# Patient Record
Sex: MALE | Race: ASIAN | NOT HISPANIC OR LATINO | URBAN - METROPOLITAN AREA
[De-identification: names, ages, dates, MRNs, and addresses within clinical notes are randomized per-mention and may not be internally consistent; named-entity substitution may affect disease eponyms.]

---

## 2023-09-26 ENCOUNTER — EMERGENCY (EMERGENCY)
Facility: HOSPITAL | Age: 23
LOS: 1 days | Discharge: HOME | End: 2023-09-26
Attending: STUDENT IN AN ORGANIZED HEALTH CARE EDUCATION/TRAINING PROGRAM | Admitting: STUDENT IN AN ORGANIZED HEALTH CARE EDUCATION/TRAINING PROGRAM
Payer: COMMERCIAL

## 2023-09-26 VITALS
OXYGEN SATURATION: 100 % | SYSTOLIC BLOOD PRESSURE: 128 MMHG | DIASTOLIC BLOOD PRESSURE: 89 MMHG | TEMPERATURE: 98 F | RESPIRATION RATE: 18 BRPM | HEART RATE: 75 BPM

## 2023-09-26 PROCEDURE — 99284 EMERGENCY DEPT VISIT MOD MDM: CPT

## 2023-09-26 PROCEDURE — 70450 CT HEAD/BRAIN W/O DYE: CPT | Mod: 26,MA

## 2023-09-26 PROCEDURE — 70486 CT MAXILLOFACIAL W/O DYE: CPT | Mod: 26,MA

## 2023-09-26 RX ORDER — ACETAMINOPHEN 500 MG
975 TABLET ORAL ONCE
Refills: 0 | Status: COMPLETED | OUTPATIENT
Start: 2023-09-26 | End: 2023-09-26

## 2023-09-26 RX ADMIN — Medication 1 TABLET(S): at 20:01

## 2023-09-26 RX ADMIN — Medication 975 MILLIGRAM(S): at 13:46

## 2023-09-26 NOTE — ED PROVIDER NOTE - PATIENT PORTAL LINK FT
You can access the FollowMyHealth Patient Portal offered by [facility] by registering at the following website: [email or web address]. By joining [employer]’s FollowMyHealth portal, you will also be able to view your health information using other applications (apps) compatible with our system.

## 2023-09-26 NOTE — ED PROVIDER NOTE - OBJECTIVE STATEMENT
22-year-old male no past medical history presents to ED status post head trauma 5 days ago.  Patient states he was helping his family member clean the house when the floor was wet and he slipped forward hitting his face and losing consciousness.  Patient complaining of left periorbital swelling and bruising that has not resolved and left eye redness.  Patient denies headache dizziness, amnesia, nausea vomiting, chest pain, back pain, neck pain weakness numbness or tingling facial droop or slurred speech. No vision changes. No blood thinners.

## 2023-09-26 NOTE — ED ADULT NURSE NOTE - NSFALLUNIVINTERV_ED_ALL_ED
Bed/Stretcher in lowest position, wheels locked, appropriate side rails in place/Call bell, personal items and telephone in reach/Instruct patient to call for assistance before getting out of bed/chair/stretcher/Non-slip footwear applied when patient is off stretcher/[place] to call system/Physically safe environment - no spills, clutter or unnecessary equipment/Purposeful proactive rounding/Room/bathroom lighting operational, light cord in reach

## 2023-09-26 NOTE — ED PROVIDER NOTE - ENMT, MLM
Airway patent, Nasal mucosa clear. Mouth with normal mucosa. Throat has no vesicles, no oropharyngeal exudates and uvula is midline. No septal hematoma. Tms grey b/l no hemotympanum. Left raccoon eye and subconjunctival hemorrhage. No facial bony ttp.

## 2023-09-26 NOTE — ED ADULT NURSE NOTE - NSFALLRISK_ED_ALL_ED
Patient is requesting all [facility] be sent [facility]abella Spotted [facility] due to loss insurance No

## 2023-09-26 NOTE — CONSULT NOTE ADULT - ASSESSMENT
Assessment:  22M w/ non-contributory hx presenting to ED, with Left ZMC with non displaced ruben-Lefort 1 s/p mechanical fall with LOC 5 days ago. Reports no changes in vision/diplopia, denies headache/nausea.     Plan:  - Given 5 day injury with remaining swelling/edema and stable clinical exam, will recommend F/U on Friday in the clinic, plan for possible ORIF of L ZMC  - Soft non chew diet  - Sinus precautions  - Augmentin 875-125mg BIDx7 days  - Pain medication.    Outpatient clinic phone: [phone]    Oral and Maxillofacial Surgery  #[id number]

## 2023-09-26 NOTE — ED PROVIDER NOTE - NS ED ATTENDING STATEMENT MOD
This was a shared visit with the GIDEON. I reviewed and verified the documentation and independently performed the documented:

## 2023-09-26 NOTE — ED PROVIDER NOTE - MUSCULOSKELETAL, MLM
Spine appears normal, range of motion is not limited, no muscle or joint tenderness. No midline spinal ttp.

## 2023-09-26 NOTE — ED PROVIDER NOTE - NSFOLLOWUPINSTRUCTIONS_ED_ALL_ED_FT
Follow up with the [facility] Dental clinic this Friday for your facial fractures. Call tomorrow for an appointment. the phone number is , the address is [facility] [place] [address]. Take the antibiotic (Augmentin) two times per day for 7 days, use artifical tears in your left eye 4 times per day, take the Polytrim (antibiotic) drops 4 times per day and use the erythromycin you were given before bedtime. Do not drink through straws and do not blow your nose. Follow up with the [facility] Ophthalmology clinic for your corneal abrasion at [address] [facility] [address] [facility] [address] [facility] [address] . Take the antibiotic pills (Augmentin) as prescribed and take Naproxen as needed for pain. Advance activity as tolerated.  Continue all previously prescribed medications as directed.  Follow up with your primary care physician in 48-72 hours- bring copies of your results.  Return to the ER for worsening or persistent symptoms, and/or ANY NEW OR CONCERNING SYMPTOMS. THIS INCLUDES BUT IS NOT LIMITED TO FEVER, CHILLS, NIGHTSWEATS, SEVERE PAIN OR FOR ANY OTHER SYMPTOMS THAT CONCERN YOU. If you have issues obtaining follow up, please call: [phone] to obtain a doctor or specialist who takes your insurance in your area.  You may call [phone] to make an appointment with the internal medicine clinic.

## 2023-09-26 NOTE — ED PROVIDER NOTE - RESPIRATORY, MLM
Breath sounds clear and equal bilaterally. Slit Excision Additional Text (Leave Blank If You Do Not Want): A linear line was drawn on the skin overlying the lesion. An incision was made slowly until the lesion was visualized.  Once visualized, the lesion was removed with blunt dissection.

## 2023-09-26 NOTE — CONSULT NOTE ADULT - SUBJECTIVE AND OBJECTIVE BOX
Subjective:  22M w/ non-contributory hx presenting to ED s/p 5 day hx of L C fx s/p slipping on the floor indoor, fall on the face, LOC. Pt presented to ED today after exacerbation of the Left periorbital edema, ecchymosis, eye redness. Pt reports occlusion feels grossly normal but has mild change in the posterior left. Pt denies change/loss of vision, diplopia, blurry vision, headache, dizziness, nausea, otorrhea, tinnitus, SOB.     ICU Vital Signs Last 24 Hrs  T(C): 36.9 (26 Sep 2023 12:22), Max: 36.9 (26 Sep 2023 12:22)  T(F): 98.5 (26 Sep 2023 12:22), Max: 98.5 (26 Sep 2023 12:22)  HR: 66 (26 Sep 2023 18:38) (66 - 75)  BP: 116/77 (26 Sep 2023 18:38) (116/77 - 128/89)  BP(mean): --  ABP: --  ABP(mean): --  RR: 16 (26 Sep 2023 18:38) (16 - 18)  SpO2: 100% (26 Sep 2023 18:38) (100% - 100%)    O2 Parameters below as of 26 Sep 2023 18:38  Patient On (Oxygen Delivery Method): room air    Physical Exam  H: L midface edema and ecchymosis without gross esthetic deformity, L arch without gross palpable step or deformity. L periorbital ecchymosis, edema  E: L subconjunctival hemorrhage, PERRL, EOMI b/l  E: (-) otorrhea, hearing at baseline  N: (-) rhinorrhea, no septal hematoma, nares patent b/l  Maxillofacial: Baseline mild L anterior open bite. New mild posterior open bit. Wears partial denture. PAUL ~30mm, minimal L posterior maxillary step defect, no segmental mobility. Stable occlusion b/l.    PAST MEDICAL & SURGICAL HISTORY:  No pertinent past medical history  No significant past surgical history      Home Medications:  N/A    Allergies  No Known Allergies    CT Maxillofacial (9/26/23)  FACIAL BONES: Acute comminuted mildly displaced fracture of the anterior, medial and posterolateral walls of the the left maxillary sinus with associated blunting of the premaxillary fat pad and hemorrhage in the left maxillary sinus .Additionally there are acute mildly displaced comminuted fractures of the inferior and lateral orbital wall and left lamina papyracea. Fracture appears to involve the left infraorbital foramen. There is an acute mildly displaced fracture of the lateral process of the left zygoma. The pterygoid plate is within normal limits.  MANDIBLE: No acute fractures.  REMAINING VISUALIZED BONES: Cervical spine is intact.  SINONASAL CAVITIES: High-density products likely hemorrhage opacifying the left maxillary sinus. Mild mucosal thickening of the sphenoid and frontal sinuses. Nasal septal comminuted fracture, image 2-194.  ORBITAL CONTENTS: Soft tissue swelling overlying the left orbit. No retrobulbar hemorrhage. Intraorbital muscles are intact.    IMPRESSION:  Acute left-sided zygomaticomaxillary complex fracture with fractures of the orbit and nasal septum as described in body the report. No associated intraorbital complications.

## 2023-09-26 NOTE — ED ADULT NURSE NOTE - OBJECTIVE STATEMENT
Patient received in wellness, exam room 1. Patient A&Ox3 and ambulatory at baseline. Patient presents to the ED s/p mechanical fall approximately 5 days ago. Patient denies significant phx. Patient states approximately 5 days ago in [facility] he slipped and fell. Patient states he hit his face; patient endorses hitting head and losing consciousness. Patient denies anticoagulant use. Ecchymosis noted to periorbital area, left side of nose and upper lip area; blood/redness noted to left sclera. Patient denies headache, dizziness, lightheadedness, photophobia, vision changes, nausea/vomiting, fever/chills. Patient offers no complaints at this time. Respirations even and unlabored, no signs/symptoms of acute distress; patient denies dyspnea, shortness of breath, and chest pain. Patient is stable at this time. Steady gait observed.

## 2023-09-26 NOTE — ED PROVIDER NOTE - CLINICAL SUMMARY MEDICAL DECISION MAKING FREE TEXT BOX
22-year-old male no past medical history presents to ED status post head trauma 5 days ago.  Patient states he was helping his family member clean the house when the floor was wet and he slipped forward hitting his face and losing consciousness.  Patient complaining of left periorbital swelling and bruising that has not resolved and left eye redness. on exam pt with L raccoon eye and subconjunctival hemorrhage. plan to scan CT head, Ct max face r/o fracture pt with concussion

## 2023-09-26 NOTE — ED PROVIDER NOTE - PROGRESS NOTE DETAILS
PRABHAKAR Wayne- omfs evaluated pt. recommending optho consult. awaiting omfs resident to speak to attending for plan. PRABHAKAR Wayne- spoke with optho will see pt in ED. per omfs, suggesting dc home after optho eval and can have outpatient surgery follow up this Friday. PRABHAKAR Montaño: Received signout on the pt, pt is stable, will D/C as per OMFS and Ophtho recommendations.

## 2023-09-26 NOTE — ED PROVIDER NOTE - ATTENDING APP SHARED VISIT CONTRIBUTION OF CARE
I have personally performed a face to face medical and diagnostic evaluation of the patient. I have discussed with and reviewed the ACP's note and agree with the History, ROS, Physical Exam and MDM unless otherwise indicated. A brief summary of my personal evaluation and impression can be found below.     22-year-old male no past medical history presents to ED status post head trauma 5 days ago.  Patient states he was helping his family member clean the house when the floor was wet and he slipped forward hitting his face and losing consciousness.  Patient complaining of left periorbital swelling and bruising that has not resolved and left eye redness. On exam, vital signs stable, patient neuro intact, but with tenderness to the superior outer portion of the left orbit.  Subconjunctival hemorrhage covering the exterior eye as well.  Visual acuity intact.  No pain in extraocular movements.  Hematoma to the lower portion of the orbit.  Given extent of symptoms, plan for CT head, Ct max face  to assess for any intracranial injury or facial fractures.  Plan for pain control.  Patient likely has a concussion.  Will reassess to dispo. Wanda Mendoza, ED Attending

## 2023-09-26 NOTE — ED ADULT TRIAGE NOTE - CHIEF COMPLAINT QUOTE
Pt states he had a fall Thursday while intoxicated and noted to have L sided facial swelling and bruising. Pt denies LOC.

## 2023-09-27 VITALS
RESPIRATION RATE: 18 BRPM | OXYGEN SATURATION: 100 % | HEART RATE: 70 BPM | DIASTOLIC BLOOD PRESSURE: 83 MMHG | TEMPERATURE: 98 F | SYSTOLIC BLOOD PRESSURE: 114 MMHG

## 2023-09-27 RX ORDER — POLYMYXIN B SULF/TRIMETHOPRIM 10000-1/ML
1 DROPS OPHTHALMIC (EYE) ONCE
Refills: 0 | Status: COMPLETED | OUTPATIENT
Start: 2023-09-27 | End: 2023-09-27

## 2023-09-27 RX ORDER — POLYMYXIN B SULF/TRIMETHOPRIM 10000-1/ML
1 DROPS OPHTHALMIC (EYE) ONCE
Refills: 0 | Status: DISCONTINUED | OUTPATIENT
Start: 2023-09-27 | End: 2023-09-27

## 2023-09-27 RX ORDER — POLYMYXIN B SULF/TRIMETHOPRIM 10000-1/ML
1 DROPS OPHTHALMIC (EYE)
Qty: 1 | Refills: 0
Start: 2023-09-27 | End: 2023-10-01

## 2023-09-27 RX ADMIN — Medication 1 DROP(S): at 01:30

## 2023-09-27 NOTE — CONSULT NOTE ADULT - SUBJECTIVE AND OBJECTIVE BOX
[facility] DEPARTMENT OF OPHTHALMOLOGY - INITIAL ADULT CONSULT  -----------------------------------------------------------------------------------------------------------------  Raad Alfaro MD, PGY3  Available on [employer] Teams  -----------------------------------------------------------------------------------------------------------------    HPI:    22-year-old male no past medical history presents to ED status post head trauma 5 days ago.  Patient states he was helping his family member clean the house when the floor was wet and he slipped forward hitting his face and losing consciousness.  Patient complaining of left periorbital swelling and bruising that has not resolved and left eye redness.  Patient denies headache dizziness, amnesia, nausea vomiting, chest pain, back pain, neck pain weakness numbness or tingling facial droop or slurred speech. No vision changes. No blood thinners.    Interval History: patient denies any vision changes, no diplopia, no flashes, no floaters, no erythema. Has had subconj heme but no irritation, pain, tearing, discharge.    Past Medical History: none  Past Ocular History: none  Drops: none  Medications: none  Allergies: NKDA  Family History: denies  Surgical History: denies  Outpatient Ophthalmologist: none      Review of Systems:  Constitutional: No fever, chills  Eyes: No blurry vision, flashes, floaters, FBS, erythema, discharge, double vision, OU  Neuro: No tremors  Cardiovascular: No chest pain, palpitations  Respiratory: No SOB, no cough  GI: No nausea, vomiting, abdominal pain    Vital Signs: T(C): 36.9 (09-26-23 @ 12:22)  T(F): 98.5 (09-26-23 @ 12:22), Max: 98.5 (09-26-23 @ 12:22)  HR: 66 (09-26-23 @ 18:38) (66 - 75)  BP: 116/77 (09-26-23 @ 18:38) (116/77 - 128/89)  RR:  (16 - 18)  SpO2:  (100% - 100%)  Wt(kg): --  AAOx3    Ophthalmology Exam:  Visual acuity (cc): 20/20 OD. 20/20 OS  Pupils: PERRL OU, no APD  Intraocular Pressure:  Ttono 17 OD, 14 OS  Extraocular movements (EOMs): Full OU, no pain, no diplopia. no clinical signs of concerns for entrapment  Confrontational Visual Field (CVF): Full OD. Full OS    Slit Lamp Exam  External: Normal OD. periorbital ecchymosis  Lids/Lashes/Lacrimal Ducts: Flat OD. trace edema inferior OS with ecchymosis  Sclera/Conjunctiva: White and quiet OD. temporal subconj heme about  degrees from 2 to 6 oclock  Cornea: Clear OD. inferior cornea defect and staining concern for abrasion, no dendritic pattern; DTBUT OU  Anterior Chamber: Deep and formed OU. no cell no flare  Iris: Flat OU.  Lens: Clear OU.    Fundus Exam: dilated with 1% tropicamide and 2.5% phenylephrine  Approval obtained from primary team for dilation  Patient aware that pupils can remained dilated for at least 4-6 hours.  Exam performed with 20 D lens    ***  Vitreous: wnl OU  Disc, cup/disc: sharp and pink, 0.4 OU  Macula: wnl OU  Vessels: wnl OU  Periphery: wnl OU    Labs/Imaging:  < from: CT Maxillofacial No Cont (09.26.23 @ 17:47) >  FINDINGS:  VENTRICLES AND SULCI:  Normal.  INTRA-AXIAL:  No mass, blood or abnormal attenuation is seen.  EXTRA-AXIAL:  No mass or collection is seen.  CALVARIUM:  Normal.  FACIAL BONES:  Acute comminuted mildly displaced fracture of the   anterior, medialand posterolateral walls of the the left maxillary sinus   with associated blunting of the premaxillary fat pad and hemorrhage in   the left maxillary sinus .Additionally there are acute mildly displaced   comminuted fractures of the inferior and lateral orbital wall and left   lamina papyracea. Fracture appears to involve the left infraorbital   foramen. There is an acute mildly displaced fracture of the lateral   process of the left zygoma. The pterygoid plate is within normal limits.  MANDIBLE:  No acute fractures.  REMAINING VISUALIZED BONES: Cervical spine is intact.  SINONASAL CAVITIES:  High-density products likely hemorrhage opacifying   the left maxillary sinus. Mild mucosal thickening of the sphenoid and   frontal sinuses. Nasal septal comminuted fracture, image 2-194.  ORBITAL CONTENTS:  Soft tissue swelling overlying the left orbit. No   retrobulbar hemorrhage. Intraorbital muscles are intact.    IMPRESSION:  Acute left-sided zygomaticomaxillary complex fracture with fracturesof   the orbit and nasal septum as described in body the report. No associated   intraorbital complications.    Hemorrhage opacification of the left maxillary sinus.    --- End of Report ---    < end of copied text >     [facility] DEPARTMENT OF OPHTHALMOLOGY - INITIAL ADULT CONSULT  -----------------------------------------------------------------------------------------------------------------  Raad Alfaro MD, PGY3  Available on [employer] Teams  -----------------------------------------------------------------------------------------------------------------    HPI:    22-year-old male no past medical history presents to ED status post head trauma 5 days ago.  Patient states he was helping his family member clean the house when the floor was wet and he slipped forward hitting his face and losing consciousness.  Patient complaining of left periorbital swelling and bruising that has not resolved and left eye redness.  Patient denies headache dizziness, amnesia, nausea vomiting, chest pain, back pain, neck pain weakness numbness or tingling facial droop or slurred speech. No vision changes. No blood thinners.    Interval History: patient denies any vision changes, no diplopia, no flashes, no floaters, no erythema. Has had subconj heme but no irritation, pain, tearing, discharge.    Past Medical History: none  Past Ocular History: none  Drops: none  Medications: none  Allergies: NKDA  Family History: denies  Surgical History: denies  Outpatient Ophthalmologist: none      Review of Systems:  Constitutional: No fever, chills  Eyes: No blurry vision, flashes, floaters, FBS, erythema, discharge, double vision, OU  Neuro: No tremors  Cardiovascular: No chest pain, palpitations  Respiratory: No SOB, no cough  GI: No nausea, vomiting, abdominal pain    Vital Signs: T(C): 36.9 (09-26-23 @ 12:22)  T(F): 98.5 (09-26-23 @ 12:22), Max: 98.5 (09-26-23 @ 12:22)  HR: 66 (09-26-23 @ 18:38) (66 - 75)  BP: 116/77 (09-26-23 @ 18:38) (116/77 - 128/89)  RR:  (16 - 18)  SpO2:  (100% - 100%)  Wt(kg): --  AAOx3    Ophthalmology Exam:  Visual acuity (cc): 20/20 OD. 20/20 OS  Pupils: PERRL OU, no APD  Intraocular Pressure:  Ttono 17 OD, 14 OS  Extraocular movements (EOMs): Full OU, no pain, no diplopia. no clinical signs of concerns for entrapment  Confrontational Visual Field (CVF): Full OD. Full OS    Slit Lamp Exam  External: Normal OD. periorbital ecchymosis  Lids/Lashes/Lacrimal Ducts: Flat OD. trace edema inferior OS with ecchymosis  Sclera/Conjunctiva: White and quiet OD. temporal subconj heme about  degrees from 2 to 6 oclock  Cornea: Clear OD. inferior cornea defect and staining concern for abrasion, no dendritic pattern; DTBUT OU  Anterior Chamber: Deep and formed OU. no cell no flare  Iris: Flat OU.  Lens: Clear OU.    Fundus Exam: dilated with 1% tropicamide and 2.5% phenylephrine  Approval obtained from primary team for dilation  Patient aware that pupils can remained dilated for at least 4-6 hours.  Exam performed with 20 D lens    Vitreous: wnl OU  Disc, cup/disc: sharp and pink, 0.45 OU  Macula: wnl OU  Vessels: wnl OU  Periphery: wnl OU    Labs/Imaging:  < from: CT Maxillofacial No Cont (09.26.23 @ 17:47) >  FINDINGS:  VENTRICLES AND SULCI:  Normal.  INTRA-AXIAL:  No mass, blood or abnormal attenuation is seen.  EXTRA-AXIAL:  No mass or collection is seen.  CALVARIUM:  Normal.  FACIAL BONES:  Acute comminuted mildly displaced fracture of the   anterior, medialand posterolateral walls of the the left maxillary sinus   with associated blunting of the premaxillary fat pad and hemorrhage in   the left maxillary sinus .Additionally there are acute mildly displaced   comminuted fractures of the inferior and lateral orbital wall and left   lamina papyracea. Fracture appears to involve the left infraorbital   foramen. There is an acute mildly displaced fracture of the lateral   process of the left zygoma. The pterygoid plate is within normal limits.  MANDIBLE:  No acute fractures.  REMAINING VISUALIZED BONES: Cervical spine is intact.  SINONASAL CAVITIES:  High-density products likely hemorrhage opacifying   the left maxillary sinus. Mild mucosal thickening of the sphenoid and   frontal sinuses. Nasal septal comminuted fracture, image 2-194.  ORBITAL CONTENTS:  Soft tissue swelling overlying the left orbit. No   retrobulbar hemorrhage. Intraorbital muscles are intact.    IMPRESSION:  Acute left-sided zygomaticomaxillary complex fracture with fracturesof   the orbit and nasal septum as described in body the report. No associated   intraorbital complications.    Hemorrhage opacification of the left maxillary sinus.    --- End of Report ---    < end of copied text >

## 2023-09-27 NOTE — CONSULT NOTE ADULT - ASSESSMENT
Assessment and Recommendations:  22y male with no significant past medical history/ocular history consulted for orbital floor fracture, found to have no signs of entrapment, corneal abrasion OS, no signs of traumatic iritis.    #Orbital floor fracture, OS  #Corneal abrasion, OS  #Subconj heme, OS  #Dry eye, OU  - patient fell and hit head about 5 days ago  - having periorbital swelling and bruising that has not resolved with trace periorbital ecchymosis and subconj heme noted  - VA 20/20 OU, IOP wnl, EOMI, CVF full OU, no rAPD  - anterior exam with corneal abrasion s/p trauma noted as above diagram  - anterior exam also with subconj heme temporally  - no concern for globe rupture, no concern for traumatic iritis, no concern for entrapment  - dilated exam ***  - start artificial tears OU 4x a day   - start erythromycin ointment qHS OS  - start polytrim 1gtt 4x a day OS  - all drops ordered by ophtho  - patient okay for discharge from ophthalmology perspective, can be followed up in 1 week at outpatient clinic listed below; should call to get an appointment    Outpatient Follow-up: Patient should follow-up with his/her ophthalmologist or with [facility] Department of Ophthalmology within 1 week of after discharge at:    [address]  [place] [address]  [phone]    Raad Alfaro MD, PGY3  Also available on [employer] Teams     Assessment and Recommendations:  22y male with no significant past medical history/ocular history consulted for orbital floor fracture, found to have no signs of entrapment, corneal abrasion OS, no signs of traumatic iritis.    #Orbital floor fracture, OS  #Corneal abrasion, OS  #Subconj heme, OS  #Dry eye, OU  - patient fell and hit head about 5 days ago  - having periorbital swelling and bruising that has not resolved with trace periorbital ecchymosis and subconj heme noted  - VA 20/20 OU, IOP wnl, EOMI, CVF full OU, no rAPD  - anterior exam with corneal abrasion s/p trauma noted as above diagram  - anterior exam also with subconj heme temporally  - no concern for globe rupture, no concern for traumatic iritis, no concern for entrapment  - dilated exam overall within normal limits OU  - start artificial tears OU 4x a day   - start erythromycin ointment qHS OS  - start polytrim 1gtt 4x a day OS  - all drops ordered by ophtho  - patient okay for discharge from ophthalmology perspective, can be followed up in 1 week at outpatient clinic listed below; should call to get an appointment    Outpatient Follow-up: Patient should follow-up with his/her ophthalmologist or with [facility] Department of Ophthalmology within 1 week of after discharge at:    [address]  [place] [address]  [phone]    Raad Alfaro MD, PGY3  Also available on [employer] Teams

## 2023-09-29 ENCOUNTER — APPOINTMENT (OUTPATIENT)
Age: 23
End: 2023-09-29
Payer: COMMERCIAL

## 2023-09-29 PROBLEM — Z78.9 OTHER SPECIFIED HEALTH STATUS: Chronic | Status: ACTIVE | Noted: 2023-09-26

## 2023-09-29 PROCEDURE — 99024 POSTOP FOLLOW-UP VISIT: CPT

## 2023-10-03 ENCOUNTER — INPATIENT (INPATIENT)
Facility: HOSPITAL | Age: 23
LOS: 0 days | Discharge: HOME | End: 2023-10-04
Attending: ORAL & MAXILLOFACIAL SURGERY | Admitting: ORAL & MAXILLOFACIAL SURGERY
Payer: COMMERCIAL

## 2023-10-03 ENCOUNTER — TRANSCRIPTION ENCOUNTER (OUTPATIENT)
Age: 23
End: 2023-10-03

## 2023-10-03 VITALS
TEMPERATURE: 98 F | OXYGEN SATURATION: 100 % | RESPIRATION RATE: 18 BRPM | HEART RATE: 89 BPM | DIASTOLIC BLOOD PRESSURE: 79 MMHG | SYSTOLIC BLOOD PRESSURE: 110 MMHG

## 2023-10-03 DIAGNOSIS — S02.80XA FRACTURE OF OTHER SPECIFIED SKULL AND FACIAL BONES, UNSPECIFIED SIDE, INITIAL ENCOUNTER FOR CLOSED FRACTURE: ICD-10-CM

## 2023-10-03 LAB
ALBUMIN SERPL ELPH-MCNC: 4.8 G/DL — SIGNIFICANT CHANGE UP (ref 3.3–5)
ALP SERPL-CCNC: 80 U/L — SIGNIFICANT CHANGE UP (ref 40–120)
ALT FLD-CCNC: 15 U/L — SIGNIFICANT CHANGE UP (ref 4–41)
ANION GAP SERPL CALC-SCNC: 9 MMOL/L — SIGNIFICANT CHANGE UP (ref 7–14)
APTT BLD: 32.4 SEC — SIGNIFICANT CHANGE UP (ref 24.5–35.6)
AST SERPL-CCNC: 19 U/L — SIGNIFICANT CHANGE UP (ref 4–40)
BASOPHILS # BLD AUTO: 0.04 K/UL — SIGNIFICANT CHANGE UP (ref 0–0.2)
BASOPHILS NFR BLD AUTO: 0.9 % — SIGNIFICANT CHANGE UP (ref 0–2)
BILIRUB SERPL-MCNC: <0.2 MG/DL — SIGNIFICANT CHANGE UP (ref 0.2–1.2)
BLD GP AB SCN SERPL QL: NEGATIVE — SIGNIFICANT CHANGE UP
BUN SERPL-MCNC: 10 MG/DL — SIGNIFICANT CHANGE UP (ref 7–23)
CALCIUM SERPL-MCNC: 9.8 MG/DL — SIGNIFICANT CHANGE UP (ref 8.4–10.5)
CHLORIDE SERPL-SCNC: 106 MMOL/L — SIGNIFICANT CHANGE UP (ref 98–107)
CO2 SERPL-SCNC: 28 MMOL/L — SIGNIFICANT CHANGE UP (ref 22–31)
CREAT SERPL-MCNC: 0.78 MG/DL — SIGNIFICANT CHANGE UP (ref 0.5–1.3)
EGFR: 129 ML/MIN/1.73M2 — SIGNIFICANT CHANGE UP
EOSINOPHIL # BLD AUTO: 0.11 K/UL — SIGNIFICANT CHANGE UP (ref 0–0.5)
EOSINOPHIL NFR BLD AUTO: 2.4 % — SIGNIFICANT CHANGE UP (ref 0–6)
GLUCOSE SERPL-MCNC: 85 MG/DL — SIGNIFICANT CHANGE UP (ref 70–99)
HCT VFR BLD CALC: 44.7 % — SIGNIFICANT CHANGE UP (ref 39–50)
HGB BLD-MCNC: 14.6 G/DL — SIGNIFICANT CHANGE UP (ref 13–17)
IANC: 2.78 K/UL — SIGNIFICANT CHANGE UP (ref 1.8–7.4)
IMM GRANULOCYTES NFR BLD AUTO: 0.2 % — SIGNIFICANT CHANGE UP (ref 0–0.9)
INR BLD: 0.95 RATIO — SIGNIFICANT CHANGE UP (ref 0.85–1.18)
LYMPHOCYTES # BLD AUTO: 1.4 K/UL — SIGNIFICANT CHANGE UP (ref 1–3.3)
LYMPHOCYTES # BLD AUTO: 30.2 % — SIGNIFICANT CHANGE UP (ref 13–44)
MCHC RBC-ENTMCNC: 27.3 PG — SIGNIFICANT CHANGE UP (ref 27–34)
MCHC RBC-ENTMCNC: 32.7 GM/DL — SIGNIFICANT CHANGE UP (ref 32–36)
MCV RBC AUTO: 83.6 FL — SIGNIFICANT CHANGE UP (ref 80–100)
MONOCYTES # BLD AUTO: 0.3 K/UL — SIGNIFICANT CHANGE UP (ref 0–0.9)
MONOCYTES NFR BLD AUTO: 6.5 % — SIGNIFICANT CHANGE UP (ref 2–14)
NEUTROPHILS # BLD AUTO: 2.78 K/UL — SIGNIFICANT CHANGE UP (ref 1.8–7.4)
NEUTROPHILS NFR BLD AUTO: 59.8 % — SIGNIFICANT CHANGE UP (ref 43–77)
NRBC # BLD: 0 /100 WBCS — SIGNIFICANT CHANGE UP (ref 0–0)
NRBC # FLD: 0 K/UL — SIGNIFICANT CHANGE UP (ref 0–0)
PLATELET # BLD AUTO: 255 K/UL — SIGNIFICANT CHANGE UP (ref 150–400)
POTASSIUM SERPL-MCNC: 4.3 MMOL/L — SIGNIFICANT CHANGE UP (ref 3.5–5.3)
POTASSIUM SERPL-SCNC: 4.3 MMOL/L — SIGNIFICANT CHANGE UP (ref 3.5–5.3)
PROT SERPL-MCNC: 7.2 G/DL — SIGNIFICANT CHANGE UP (ref 6–8.3)
PROTHROM AB SERPL-ACNC: 10.7 SEC — SIGNIFICANT CHANGE UP (ref 9.5–13)
RBC # BLD: 5.35 M/UL — SIGNIFICANT CHANGE UP (ref 4.2–5.8)
RBC # FLD: 12.8 % — SIGNIFICANT CHANGE UP (ref 10.3–14.5)
RH IG SCN BLD-IMP: POSITIVE — SIGNIFICANT CHANGE UP
SODIUM SERPL-SCNC: 143 MMOL/L — SIGNIFICANT CHANGE UP (ref 135–145)
WBC # BLD: 4.64 K/UL — SIGNIFICANT CHANGE UP (ref 3.8–10.5)
WBC # FLD AUTO: 4.64 K/UL — SIGNIFICANT CHANGE UP (ref 3.8–10.5)

## 2023-10-03 PROCEDURE — 99285 EMERGENCY DEPT VISIT HI MDM: CPT

## 2023-10-03 RX ORDER — INFLUENZA VIRUS VACCINE 15; 15; 15; 15 UG/.5ML; UG/.5ML; UG/.5ML; UG/.5ML
0.5 SUSPENSION INTRAMUSCULAR ONCE
Refills: 0 | Status: DISCONTINUED | OUTPATIENT
Start: 2023-10-03 | End: 2023-10-04

## 2023-10-03 RX ORDER — SODIUM CHLORIDE 9 MG/ML
1000 INJECTION, SOLUTION INTRAVENOUS
Refills: 0 | Status: DISCONTINUED | OUTPATIENT
Start: 2023-10-03 | End: 2023-10-04

## 2023-10-03 RX ORDER — OXYCODONE HYDROCHLORIDE 5 MG/1
10 TABLET ORAL EVERY 4 HOURS
Refills: 0 | Status: DISCONTINUED | OUTPATIENT
Start: 2023-10-03 | End: 2023-10-04

## 2023-10-03 RX ORDER — OXYCODONE HYDROCHLORIDE 5 MG/1
5 TABLET ORAL EVERY 4 HOURS
Refills: 0 | Status: DISCONTINUED | OUTPATIENT
Start: 2023-10-03 | End: 2023-10-04

## 2023-10-03 RX ORDER — ACETAMINOPHEN 500 MG
650 TABLET ORAL EVERY 6 HOURS
Refills: 0 | Status: DISCONTINUED | OUTPATIENT
Start: 2023-10-03 | End: 2023-10-04

## 2023-10-03 RX ORDER — IBUPROFEN 200 MG
600 TABLET ORAL EVERY 6 HOURS
Refills: 0 | Status: DISCONTINUED | OUTPATIENT
Start: 2023-10-03 | End: 2023-10-04

## 2023-10-03 RX ADMIN — Medication 600 MILLIGRAM(S): at 19:24

## 2023-10-03 RX ADMIN — Medication 650 MILLIGRAM(S): at 19:55

## 2023-10-03 RX ADMIN — Medication 600 MILLIGRAM(S): at 19:55

## 2023-10-03 RX ADMIN — SODIUM CHLORIDE 30 MILLILITER(S): 9 INJECTION, SOLUTION INTRAVENOUS at 15:38

## 2023-10-03 RX ADMIN — Medication 650 MILLIGRAM(S): at 19:25

## 2023-10-03 NOTE — H&P ADULT - HISTORY OF PRESENT ILLNESS
22M w/ non-contributory hx presenting to ED on 9/26 s/p 5 day hx of L ZMC fx s/p slipping on the floor indoor, fall on the face, LOC. Pt presented to ED after exacerbation of the Left periorbital edema, ecchymosis, eye redness. Pt reports occlusion feels grossly normal but has mild change in the posterior left.     Today, pt returned to ED due to increased pain and swelling on L side, as well as feelings of numbness on L upper lip. Pt denies change/loss of vision, diplopia, blurry vision, headache, dizziness, nausea, otorrhea, tinnitus, SOB.

## 2023-10-03 NOTE — H&P ADULT - NSHPPHYSICALEXAM_GEN_ALL_CORE
Physical Exam  H: L midface edema and ecchymosis without gross esthetic deformity, L arch without gross palpable step or deformity. L periorbital ecchymosis, edema  E: L subconjunctival hemorrhage, PERRL, EOMI b/l  E: (-) otorrhea, hearing at baseline  N: (-) rhinorrhea, no septal hematoma, nares patent b/l  Maxillofacial: Baseline mild L anterior open bite. New mild posterior open bit. Wears partial denture. PAUL ~30mm, minimal L posterior maxillary step defect, no segmental mobility. Stable occlusion b/l.

## 2023-10-03 NOTE — ED ADULT TRIAGE NOTE - CHIEF COMPLAINT QUOTE
pt c/o left facial pain after a fall 1 week ago.  pt was seen here after the fall and was told he has a facial fx.  pt back today for pain

## 2023-10-03 NOTE — ED ADULT NURSE NOTE - OBJECTIVE STATEMENT
Patient received to wellness room 2 A&Ox4, ambulatory with PMHx left sided facial fracture coming to the ED for c/o left sided facial pain s/p mechanical fall x 1 week ago. Pt was seen here after the fall and was told he has a facial fracture on the left side. Patient endorsing left sided facial pain and mild numbness to the upper jaw. Pt RR equal and unlabored. Airway intact. Denies difficulty breathing. denies chest pain, sob. Medicated as ordered. Care plan continued. Comfort measures provided. Safety maintained. Awaiting further orders and OMFS consult.

## 2023-10-03 NOTE — ED ADULT NURSE REASSESSMENT NOTE - NS ED NURSE REASSESS COMMENT FT1
20G IV placed in the L AC. Labs drawn and sent.
Patient resting in stretcher A&Ox4, ambulatory, accompanied by family members, fluids running at 30ml/hr. RR equal and unlabored. Denies pain at this time. Care plan continued. Comfort measures provided, Safety maintained. Awaiting bed assignment.

## 2023-10-03 NOTE — ED PROVIDER NOTE - OBJECTIVE STATEMENT
OMFS Consult note on 9/26:   22M w/ non-contributory hx presenting to ED, with Left ZMC with non displaced ruben-Lefort 1 s/p mechanical fall with LOC 5 days ago. Reports no changes in vision/diplopia, denies headache/nausea.     Plan:  - Given 5 day injury with remaining swelling/edema and stable clinical exam, will recommend F/U on Friday in the clinic, plan for possible ORIF of L ZMC  - Soft non chew diet  - Sinus precautions  - Augmentin 875-125mg BIDx7 days  - Pain medication.    Outpatient clinic phone: [phone] 21 y/o M with known left ZMC fracture s/p mechanical fall last week. Seen at OMFS clinic on Friday 9/29 and was tld e needed surgery. Patient declined at that time. Returns to ED with worsening pain and now numbness to the left side of the face. States he is willing to have the surgery now. Denies fever, chills, dizziness, CP, SOB, weakness. Took no meds PTA. Ate 9pm last night.   NKDA    OMFS Consult note on 9/26:   22M w/ non-contributory hx presenting to ED, with Left ZMC with non displaced ruben-Lefort 1 s/p mechanical fall with LOC 5 days ago. Reports no changes in vision/diplopia, denies headache/nausea.     Plan:  - Given 5 day injury with remaining swelling/edema and stable clinical exam, will recommend F/U on Friday in the clinic, plan for possible ORIF of L ZMC  - Soft non chew diet  - Sinus precautions  - Augmentin 875-125mg BIDx7 days  - Pain medication.    Outpatient clinic phone: [phone] 21 y/o M with known left ZMC fracture s/p mechanical fall last week. Seen at OMFS clinic on Friday 9/29 and was told he needed surgery. Patient declined at that time. Returns to ED with worsening pain and now numbness to the left side of the face. States he is willing to have the surgery now. Denies fever, chills, dizziness, CP, SOB, weakness. Took no meds PTA. Ate 9pm last night.   NKDA    OMFS Consult note on 9/26:   22M w/ non-contributory hx presenting to ED, with Left ZMC with non displaced ruben-Lefort 1 s/p mechanical fall with LOC 5 days ago. Reports no changes in vision/diplopia, denies headache/nausea.     Plan:  - Given 5 day injury with remaining swelling/edema and stable clinical exam, will recommend F/U on Friday in the clinic, plan for possible ORIF of L ZMC  - Soft non chew diet  - Sinus precautions  - Augmentin 875-125mg BIDx7 days  - Pain medication.    Outpatient clinic phone: [phone]

## 2023-10-03 NOTE — H&P ADULT - ASSESSMENT
Assessment:  22M w/ non-contributory hx presenting to ED on 9/26 s/p 5 day hx of L ZMC fx s/p slipping on the floor indoor, fall on the face, LOC. Pt presented to ED after exacerbation of the Left periorbital edema, ecchymosis, eye redness. Pt reports occlusion feels grossly normal but has mild change in the posterior left. Today, pt returned to ED due to increased pain and swelling on L side, as well as feelings of numbness on L upper lip. Pt denies change/loss of vision, diplopia, blurry vision, headache, dizziness, nausea, otorrhea, tinnitus, SOB.     Plan:  -OR tomorrow AM for ORIF of L ZMC fracture  -NPO after midnight  -Pre-op labs

## 2023-10-03 NOTE — H&P ADULT - NSHPREVIEWOFSYSTEMS_GEN_ALL_CORE
Review of Systems: REVIEW OF SYSTEMS    General:	aaox3, well-appearing    Skin: negative  	  Ophthalmologic: negative    Respiratory and Thorax: negative  	  Cardiovascular:	negative    Gastrointestinal:	negative    Genitourinary:	negative    Musculoskeletal:	negative    Neurological:	negative    Psychiatric:	negative    Hematology/Lymphatics:	negative    Endocrine: negative    Allergic/Immunologic:	negative

## 2023-10-03 NOTE — ED PROVIDER NOTE - PROGRESS NOTE DETAILS
PRABHAKAR Andrew- [facility] saw patient. Recommends admission for surgery tomorrow am. labs ordered. OMFS team to follow up on results. NPO after midnight

## 2023-10-03 NOTE — ED ADULT NURSE NOTE - NSFALLUNIVINTERV_ED_ALL_ED
Bed/Stretcher in lowest position, wheels locked, appropriate side rails in place/Call bell, personal items and telephone in reach/Instruct patient to call for assistance before getting out of bed/chair/stretcher/Non-slip footwear applied when patient is off stretcher/[place] to call system/Physically safe environment - no spills, clutter or unnecessary equipment/Purposeful proactive rounding/Room/bathroom lighting operational, light cord in reach

## 2023-10-03 NOTE — ED PROVIDER NOTE - CLINICAL SUMMARY MEDICAL DECISION MAKING FREE TEXT BOX
21 y/o M with known left ZMC fracture s/p mechanical fall last week. Seen at OMFS clinic on Friday 9/29 and was tld e needed surgery. Patient declined at that time. Returns to ED with worsening pain and now numbness to the left side of the face. States he is willing to have the surgery now. Denies fever, chills, dizziness, CP, SOB, weakness. Took no meds PTA. Ate 9pm last night.   NKDA    OMFS Consult note on 9/26:   22M w/ non-contributory hx presenting to ED, with Left ZMC with non displaced ruben-Lefort 1 s/p mechanical fall with LOC 5 days ago. Reports no changes in vision/diplopia, denies headache/nausea.   - Given 5 day injury with remaining swelling/edema and stable clinical exam, will recommend F/U on Friday in the clinic, plan for possible ORIF of L ZMC  - Soft non chew diet  - Sinus precautions  - Augmentin 875-125mg BIDx7 days  - Pain medication.    Plan: OMFS consult, pain medication 23 y/o M with known left ZMC fracture s/p mechanical fall last week. Seen at OMFS clinic on Friday 9/29 and was tld e needed surgery. Patient declined at that time. Returns to ED with worsening pain and now numbness to the left side of the face. States he is willing to have the surgery now. Denies fever, chills, dizziness, CP, SOB, weakness. Took no meds PTA. Ate 9pm last night.   NKDA    OMFS Consult note on 9/26:   22M w/ non-contributory hx presenting to ED, with Left ZMC with non displaced ruben-Lefort 1 s/p mechanical fall with LOC 5 days ago. Reports no changes in vision/diplopia, denies headache/nausea.   - Given 5 day injury with remaining swelling/edema and stable clinical exam, will recommend F/U on Friday in the clinic, plan for possible ORIF of L ZMC  - Soft non chew diet  - Sinus precautions  - Augmentin 875-125mg BIDx7 days  - Pain medication.    Plan: OMFS consult, pain medication  Update: PRABHAKAR Andrew- OMFS saw patient. Recommends admission for surgery tomorrow am under Dr. Eaton. Labs ordered. OMFS team to follow up on results. NPO after midnight

## 2023-10-03 NOTE — PATIENT PROFILE ADULT - FALL HARM RISK - RISK INTERVENTIONS

## 2023-10-04 ENCOUNTER — TRANSCRIPTION ENCOUNTER (OUTPATIENT)
Age: 23
End: 2023-10-04

## 2023-10-04 ENCOUNTER — APPOINTMENT (OUTPATIENT)
Age: 23
End: 2023-10-04
Payer: COMMERCIAL

## 2023-10-04 VITALS
OXYGEN SATURATION: 100 % | RESPIRATION RATE: 17 BRPM | DIASTOLIC BLOOD PRESSURE: 68 MMHG | TEMPERATURE: 98 F | SYSTOLIC BLOOD PRESSURE: 116 MMHG | HEART RATE: 79 BPM

## 2023-10-04 LAB
ANION GAP SERPL CALC-SCNC: 8 MMOL/L — SIGNIFICANT CHANGE UP (ref 7–14)
APTT BLD: 29.7 SEC — SIGNIFICANT CHANGE UP (ref 24.5–35.6)
BUN SERPL-MCNC: 9 MG/DL — SIGNIFICANT CHANGE UP (ref 7–23)
CALCIUM SERPL-MCNC: 9.7 MG/DL — SIGNIFICANT CHANGE UP (ref 8.4–10.5)
CHLORIDE SERPL-SCNC: 102 MMOL/L — SIGNIFICANT CHANGE UP (ref 98–107)
CO2 SERPL-SCNC: 29 MMOL/L — SIGNIFICANT CHANGE UP (ref 22–31)
CREAT SERPL-MCNC: 0.84 MG/DL — SIGNIFICANT CHANGE UP (ref 0.5–1.3)
EGFR: 126 ML/MIN/1.73M2 — SIGNIFICANT CHANGE UP
GLUCOSE SERPL-MCNC: 90 MG/DL — SIGNIFICANT CHANGE UP (ref 70–99)
HCT VFR BLD CALC: 43 % — SIGNIFICANT CHANGE UP (ref 39–50)
HGB BLD-MCNC: 13.8 G/DL — SIGNIFICANT CHANGE UP (ref 13–17)
INR BLD: 1.05 RATIO — SIGNIFICANT CHANGE UP (ref 0.85–1.18)
MAGNESIUM SERPL-MCNC: 2 MG/DL — SIGNIFICANT CHANGE UP (ref 1.6–2.6)
MCHC RBC-ENTMCNC: 26.9 PG — LOW (ref 27–34)
MCHC RBC-ENTMCNC: 32.1 GM/DL — SIGNIFICANT CHANGE UP (ref 32–36)
MCV RBC AUTO: 83.8 FL — SIGNIFICANT CHANGE UP (ref 80–100)
NRBC # BLD: 0 /100 WBCS — SIGNIFICANT CHANGE UP (ref 0–0)
NRBC # FLD: 0 K/UL — SIGNIFICANT CHANGE UP (ref 0–0)
PHOSPHATE SERPL-MCNC: 4 MG/DL — SIGNIFICANT CHANGE UP (ref 2.5–4.5)
PLATELET # BLD AUTO: 242 K/UL — SIGNIFICANT CHANGE UP (ref 150–400)
POTASSIUM SERPL-MCNC: 4 MMOL/L — SIGNIFICANT CHANGE UP (ref 3.5–5.3)
POTASSIUM SERPL-SCNC: 4 MMOL/L — SIGNIFICANT CHANGE UP (ref 3.5–5.3)
PROTHROM AB SERPL-ACNC: 11.7 SEC — SIGNIFICANT CHANGE UP (ref 9.5–13)
RBC # BLD: 5.13 M/UL — SIGNIFICANT CHANGE UP (ref 4.2–5.8)
RBC # FLD: 13 % — SIGNIFICANT CHANGE UP (ref 10.3–14.5)
SODIUM SERPL-SCNC: 139 MMOL/L — SIGNIFICANT CHANGE UP (ref 135–145)
WBC # BLD: 5.12 K/UL — SIGNIFICANT CHANGE UP (ref 3.8–10.5)
WBC # FLD AUTO: 5.12 K/UL — SIGNIFICANT CHANGE UP (ref 3.8–10.5)

## 2023-10-04 PROCEDURE — 21365 OPN TX COMPLX MALAR FX: CPT

## 2023-10-04 DEVICE — IMPLANTABLE DEVICE: Type: IMPLANTABLE DEVICE | Status: FUNCTIONAL

## 2023-10-04 DEVICE — SCREW AXS SELF TAP 1.7X5MM MUST ORDER IN MULTIPLES OF 5: Type: IMPLANTABLE DEVICE | Status: FUNCTIONAL

## 2023-10-04 DEVICE — SCREW AXS SELF DRILL 1.2X4MM MUST ORDER IN MULTIPLES OF 5: Type: IMPLANTABLE DEVICE | Status: FUNCTIONAL

## 2023-10-04 DEVICE — PLATE CVD 10H: Type: IMPLANTABLE DEVICE | Status: FUNCTIONAL

## 2023-10-04 DEVICE — SCREW AXS SELF TAP CRS 1.9X5MM MUST ORDER IN MULTIPLES OF 5: Type: IMPLANTABLE DEVICE | Status: FUNCTIONAL

## 2023-10-04 RX ORDER — OXYCODONE AND ACETAMINOPHEN 5; 325 MG/1; MG/1
1 TABLET ORAL
Qty: 8 | Refills: 0
Start: 2023-10-04

## 2023-10-04 RX ORDER — OXYMETAZOLINE HYDROCHLORIDE 0.5 MG/ML
2 SPRAY NASAL
Refills: 0 | Status: DISCONTINUED | OUTPATIENT
Start: 2023-10-04 | End: 2023-10-04

## 2023-10-04 RX ORDER — CHLORHEXIDINE GLUCONATE 213 G/1000ML
15 SOLUTION TOPICAL
Qty: 1 | Refills: 0
Start: 2023-10-04

## 2023-10-04 RX ORDER — PSEUDOEPHEDRINE HCL 30 MG
30 TABLET ORAL
Refills: 0 | Status: DISCONTINUED | OUTPATIENT
Start: 2023-10-04 | End: 2023-10-04

## 2023-10-04 RX ORDER — CHLORHEXIDINE GLUCONATE 213 G/1000ML
15 SOLUTION TOPICAL
Refills: 0 | Status: DISCONTINUED | OUTPATIENT
Start: 2023-10-04 | End: 2023-10-04

## 2023-10-04 RX ORDER — IBUPROFEN 200 MG
1 TABLET ORAL
Qty: 28 | Refills: 0
Start: 2023-10-04 | End: 2023-10-10

## 2023-10-04 RX ORDER — HYDROMORPHONE HYDROCHLORIDE 2 MG/ML
0.25 INJECTION INTRAMUSCULAR; INTRAVENOUS; SUBCUTANEOUS
Refills: 0 | Status: DISCONTINUED | OUTPATIENT
Start: 2023-10-04 | End: 2023-10-04

## 2023-10-04 RX ORDER — OXYMETAZOLINE HYDROCHLORIDE 0.5 MG/ML
2 SPRAY NASAL
Qty: 1 | Refills: 0
Start: 2023-10-04 | End: 2023-10-06

## 2023-10-04 RX ORDER — SODIUM CHLORIDE 0.65 %
1 AEROSOL, SPRAY (ML) NASAL
Refills: 0 | Status: DISCONTINUED | OUTPATIENT
Start: 2023-10-04 | End: 2023-10-04

## 2023-10-04 RX ORDER — HYDROMORPHONE HYDROCHLORIDE 2 MG/ML
0.5 INJECTION INTRAMUSCULAR; INTRAVENOUS; SUBCUTANEOUS
Refills: 0 | Status: DISCONTINUED | OUTPATIENT
Start: 2023-10-04 | End: 2023-10-04

## 2023-10-04 RX ORDER — SODIUM CHLORIDE 0.65 %
2 AEROSOL, SPRAY (ML) NASAL
Qty: 2 | Refills: 0
Start: 2023-10-04

## 2023-10-04 RX ORDER — ACETAMINOPHEN 500 MG
1000 TABLET ORAL ONCE
Refills: 0 | Status: COMPLETED | OUTPATIENT
Start: 2023-10-04 | End: 2023-10-04

## 2023-10-04 RX ORDER — ONDANSETRON 8 MG/1
4 TABLET, FILM COATED ORAL ONCE
Refills: 0 | Status: DISCONTINUED | OUTPATIENT
Start: 2023-10-04 | End: 2023-10-04

## 2023-10-04 RX ADMIN — SODIUM CHLORIDE 30 MILLILITER(S): 9 INJECTION, SOLUTION INTRAVENOUS at 15:51

## 2023-10-04 RX ADMIN — CHLORHEXIDINE GLUCONATE 15 MILLILITER(S): 213 SOLUTION TOPICAL at 17:44

## 2023-10-04 RX ADMIN — Medication 400 MILLIGRAM(S): at 15:51

## 2023-10-04 RX ADMIN — OXYCODONE HYDROCHLORIDE 10 MILLIGRAM(S): 5 TABLET ORAL at 17:44

## 2023-10-04 RX ADMIN — OXYCODONE HYDROCHLORIDE 10 MILLIGRAM(S): 5 TABLET ORAL at 18:21

## 2023-10-04 NOTE — DISCHARGE NOTE PROVIDER - PROVIDER TOKENS
FREE:[LAST:[Angelito],FIRST:[Ang],PHONE:[phone]],FAX:[(   [phone]    -],ADDRESS:[[facility]  [phone] [place] [id number]],SCHEDULEDAPPT:[10/09/2023]]

## 2023-10-04 NOTE — DISCHARGE NOTE PROVIDER - CARE PROVIDER_API CALL
[place] [facility]  [phone] [place] [address]  Phone: [phone]  Fax: (   )    -  Scheduled Appointment: 10/09/2023

## 2023-10-04 NOTE — PROGRESS NOTE ADULT - SUBJECTIVE AND OBJECTIVE BOX
22M w/ non-contributory hx presenting to ED on 9/26 s/p 5 day hx of L ZMC fx s/p slipping on the floor indoor, fall on the face, LOC. Pt presented to ED after exacerbation of the Left periorbital edema, ecchymosis, eye redness. Pt reports occlusion feels grossly normal but has mild change in the posterior left. Pt returned to ED yesterday due to increased pain and swelling on L side, as well as feelings of numbness on L upper lip. Pt denies change/loss of vision, diplopia, blurry vision, headache, dizziness, nausea, otorrhea, tinnitus, SOB.     Interval Events: NAEON, aVSS    Vital Signs Last 24 Hrs  T(C): 36.4 (04 Oct 2023 06:00), Max: 37.2 (03 Oct 2023 18:43)  T(F): 97.6 (04 Oct 2023 06:00), Max: 98.9 (03 Oct 2023 18:43)  HR: 67 (04 Oct 2023 06:00) (67 - 89)  BP: 111/66 (04 Oct 2023 06:00) (101/59 - 120/71)  BP(mean): --  RR: 18 (04 Oct 2023 06:00) (14 - 18)  SpO2: 100% (04 Oct 2023 06:00) (99% - 100%)    Parameters below as of 04 Oct 2023 06:00  Patient On (Oxygen Delivery Method): room air    I&O's Detail    03 Oct 2023 07:01  -  04 Oct 2023 06:27  --------------------------------------------------------  IN:    Lactated Ringers: 270 mL  Total IN: 270 mL    OUT:    Oral Fluid: 0 mL  Total OUT: 0 mL    Total NET: 270 mL      MEDICATIONS  (STANDING):  acetaminophen   Oral Liquid .. 650 milliGRAM(s) Oral every 6 hours  ibuprofen  Suspension. 600 milliGRAM(s) Oral every 6 hours  influenza   Vaccine 0.5 milliLiter(s) IntraMuscular once  lactated ringers. 1000 milliLiter(s) (30 mL/Hr) IV Continuous <Continuous>    MEDICATIONS  (PRN):  oxyCODONE    Solution 10 milliGRAM(s) Oral every 4 hours PRN Severe Pain (7 - 10)  oxyCODONE    Solution 5 milliGRAM(s) Oral every 4 hours PRN Moderate Pain (4 - 6)    Physical Exam:   H: L midface edema and ecchymosis without gross esthetic deformity, L arch without gross palpable step or deformity. L periorbital ecchymosis, edema  E: L subconjunctival hemorrhage, PERRL, EOMI b/l  E: (-) otorrhea, hearing at baseline  N: (-) rhinorrhea, no septal hematoma, nares patent b/l  Maxillofacial: Baseline mild L anterior open bite. New mild posterior open bit. Wears partial denture. PAUL ~30mm, minimal L posterior maxillary step defect, no segmental mobility. Stable occlusion b/l.

## 2023-10-04 NOTE — DISCHARGE NOTE PROVIDER - NSDCMRMEDTOKEN_GEN_ALL_CORE_FT
Afrin 0.05% nasal spray: 2 spray(s) in each nostril 2 times a day stop on SATURDAY 10/7  amoxicillin-clavulanate 875 mg-125 mg oral tablet: 2 tab(s) orally 2 times a day  chlorhexidine 0.12% mucous membrane liquid: 15 milliliter(s) orally 2 times a day  ibuprofen 800 mg oral tablet: 1 tab(s) orally every 6 hours  oxycodone-acetaminophen 5 mg-325 mg oral tablet: 1 tab(s) orally every 6 hours as needed for  severe pain MDD: 4 tablets  Polytrim 10,000 units-1 mg/mL ophthalmic solution: 1 drop(s) in each affected eye 4 times a day  sodium chloride 0.65% nasal spray: 2 spray(s) intranasally every 1 to 2 hours as needed for  congestion

## 2023-10-04 NOTE — DISCHARGE NOTE PROVIDER - NSDCCPTREATMENT_GEN_ALL_CORE_FT
PRINCIPAL PROCEDURE  Procedure: Open reduction and internal fixation (ORIF) of fracture of left zygomatic arch  Findings and Treatment:

## 2023-10-04 NOTE — PROGRESS NOTE ADULT - ASSESSMENT
Assessment:  22M w/ non-contributory hx presenting to ED on 9/26 s/p 5 day hx of L ZMC fx s/p slipping on the floor indoor, fall on the face, LOC. Pt presented to ED after exacerbation of the Left periorbital edema, ecchymosis, eye redness. Pt reports occlusion feels grossly normal but has mild change in the posterior left. Today, pt returned to ED due to increased pain and swelling on L side, as well as feelings of numbness on L upper lip. Pt denies change/loss of vision, diplopia, blurry vision, headache, dizziness, nausea, otorrhea, tinnitus, SOB.     Plan:  -OR today for ORIF of L ZMC fracture  -Pt NPO   -F/u morning labs      Oral and Maxillofacial Surgery  [facility] Pager [phone]

## 2023-10-04 NOTE — BRIEF OPERATIVE NOTE - OPERATION/FINDINGS
Left zygomatic arch fracture repaired via lateral brow and intraoral incisions. Lateral brow plated with 4 hole plate and closed with 4-0 vicryl, 5-0 monocryl, and covered with steri strips, telfa, and tegaderm. Intraoral aspect of fracture placed with 0.6mm plate. intraoral incisions closed with 3-0 chromic gut suture.

## 2023-10-04 NOTE — DISCHARGE NOTE NURSING/CASE MANAGEMENT/SOCIAL WORK - NSDCPEFALRISK_GEN_ALL_CORE
For information on Fall & Injury Prevention, visit: [email or web address] OR  [email or web address] OR  https://www.cdc.gov/steadi/patient.html

## 2023-10-04 NOTE — DISCHARGE NOTE NURSING/CASE MANAGEMENT/SOCIAL WORK - PATIENT PORTAL LINK FT
You can access the FollowMyHealth Patient Portal offered by [facility] by registering at the following website: [email or web address]. By joining [facility]’s FollowMyHealth portal, you will also be able to view your health information using other applications (apps) compatible with our system.

## 2023-10-04 NOTE — DISCHARGE NOTE PROVIDER - HOSPITAL COURSE
10/3/23 HD1  22M w/ non-contributory hx presenting to ED on 9/26 s/p 5 day hx of L ZMC fx s/p slipping on the floor indoor, fall on the face, LOC. Pt presented to ED after exacerbation of the Left periorbital edema, ecchymosis, eye redness. Pt reports occlusion feels grossly normal but has mild change in the posterior left.     Today, pt returned to ED due to increased pain and swelling on L side, as well as feelings of numbness on L upper lip. Pt denies change/loss of vision, diplopia, blurry vision, headache, dizziness, nausea, otorrhea, tinnitus, SOB    10/4/23 HD2   pt visited bedside, resting comfortably. KATH. patient to be taken to OR today for ORIF of left ZMC fracture.    patient underwent procedure without complication and had uneventful post operative course     pt meets requirements for discharge

## 2023-10-13 ENCOUNTER — APPOINTMENT (OUTPATIENT)
Age: 23
End: 2023-10-13
Payer: COMMERCIAL

## 2023-10-13 PROCEDURE — 99204 OFFICE O/P NEW MOD 45 MIN: CPT

## 2023-10-20 ENCOUNTER — APPOINTMENT (OUTPATIENT)
Age: 23
End: 2023-10-20
Payer: COMMERCIAL

## 2023-10-20 PROCEDURE — 99024 POSTOP FOLLOW-UP VISIT: CPT

## 2024-01-26 ENCOUNTER — APPOINTMENT (OUTPATIENT)
Age: 24
End: 2024-01-26
Payer: MEDICAID

## 2024-01-26 PROCEDURE — ZZZZZ: CPT

## 2024-08-26 NOTE — ED ADULT TRIAGE NOTE - INTERNATIONAL TRAVEL
Message me after speak with [facility]  Stay on acid reducer as needed  
No

## (undated) DEVICE — WARMING BLANKET FULL UNDERBODY

## (undated) DEVICE — PACK DENTAL MINOR

## (undated) DEVICE — POSITIONER FOAM EGG CRATE ULNAR 2PCS (PINK)

## (undated) DEVICE — LABELS BLANK W PEN

## (undated) DEVICE — DRILL BIT STRYKER CRANIOMAXILLOFACIAL 1.35X50

## (undated) DEVICE — VENODYNE/SCD SLEEVE CALF MEDIUM

## (undated) DEVICE — DRAPE TOWEL BLUE 17" X 24"

## (undated) DEVICE — DRAPE 3/4 SHEET 52X76"

## (undated) DEVICE — DRILL BIT STRYKER CRANIOMAXILLOFACIAL .9X4MM

## (undated) DEVICE — SOL IRR POUR NS 0.9% 500ML

## (undated) DEVICE — ELCTR GROUNDING PAD ADULT COVIDIEN

## (undated) DEVICE — BLADE SURGICAL #15 CARBON

## (undated) DEVICE — APPLICATOR COTTON TIP 6" STERLE

## (undated) DEVICE — WARMING BLANKET LOWER ADULT

## (undated) DEVICE — PREP BETADINE SPONGE STICKS

## (undated) DEVICE — GLV 8 PROTEXIS (WHITE)